# Patient Record
Sex: FEMALE | Race: BLACK OR AFRICAN AMERICAN | URBAN - METROPOLITAN AREA
[De-identification: names, ages, dates, MRNs, and addresses within clinical notes are randomized per-mention and may not be internally consistent; named-entity substitution may affect disease eponyms.]

---

## 2018-04-18 ENCOUNTER — EXTERNAL NURSING HOME DOCUMENTATION (OUTPATIENT)
Dept: INTERNAL MEDICINE CLINIC | Age: 73
End: 2018-04-18

## 2018-04-18 DIAGNOSIS — I48.20 CHRONIC ATRIAL FIBRILLATION (HCC): Primary | ICD-10-CM

## 2018-04-18 DIAGNOSIS — I10 ESSENTIAL HYPERTENSION: ICD-10-CM

## 2018-04-18 DIAGNOSIS — D64.9 CHRONIC ANEMIA: ICD-10-CM

## 2018-04-18 DIAGNOSIS — J18.9 PNEUMONIA DUE TO INFECTIOUS ORGANISM, UNSPECIFIED LATERALITY, UNSPECIFIED PART OF LUNG: ICD-10-CM

## 2018-04-18 DIAGNOSIS — T81.30XA ABDOMINAL WOUND DEHISCENCE, INITIAL ENCOUNTER: ICD-10-CM

## 2018-04-18 DIAGNOSIS — E11.8 TYPE 2 DIABETES MELLITUS WITH COMPLICATION, UNSPECIFIED WHETHER LONG TERM INSULIN USE: ICD-10-CM

## 2018-04-18 DIAGNOSIS — N18.30 STAGE 3 CHRONIC KIDNEY DISEASE (HCC): ICD-10-CM

## 2018-04-19 ENCOUNTER — EXTERNAL NURSING HOME DOCUMENTATION (OUTPATIENT)
Dept: INTERNAL MEDICINE CLINIC | Age: 73
End: 2018-04-19

## 2018-04-19 DIAGNOSIS — Z98.890 S/P EXPLORATORY LAPAROTOMY: ICD-10-CM

## 2018-04-19 DIAGNOSIS — E11.8 TYPE 2 DIABETES MELLITUS WITH COMPLICATION, UNSPECIFIED WHETHER LONG TERM INSULIN USE: ICD-10-CM

## 2018-04-19 DIAGNOSIS — I48.91 ATRIAL FIBRILLATION, UNSPECIFIED TYPE (HCC): Primary | ICD-10-CM

## 2018-04-19 DIAGNOSIS — T81.89XD DELAYED SURGICAL WOUND HEALING, SUBSEQUENT ENCOUNTER: ICD-10-CM

## 2018-04-19 DIAGNOSIS — I10 ESSENTIAL HYPERTENSION: ICD-10-CM

## 2018-04-19 PROBLEM — T81.89XA DELAYED SURGICAL WOUND HEALING: Status: ACTIVE | Noted: 2018-04-19

## 2018-04-19 NOTE — PROGRESS NOTES
THIS IS NOT A COMPLETE MEDICAL RECORD ON THIS PATIENT. THIS IS A PATIENT AT [facility]. PLEASE CONTACT THE FACILITY LISTED BELOW FOR MORE INFORMATION ON THIS PATIENT. THE COMPLETE RECORD RESIDES WITH THIS LONG TERM CARE FACILITY. HISTORY OF PRESENT ILLNESS  Scarlet Cruz is a 81 y.o. female. This patient is currently under the care of Dr. Marie Sanchez at [facility].  The patient was admitted to this facility following hospitalization related to severe sepsis and Afib with RVR. The patient is s/p exploratory laparotomy and reduction of incarcerated ventral hernia with partial omentectomy on 03/26/18 by Dr. Bhumi Sargent. She underwent abdominal re-exploration and partial fascial closure with Dr. Bhumi Sargent on 03/29/18 and abdominal re-exploration and washout with ventral hernia repair and closure on 04/02/18. Her past medical history includes diabetes and hypertension. The patient is seen today for follow-up. Nursing does not report any concerns or changes in condition at this time. HPI    Review of Systems   Constitutional: Negative for chills and fever. Respiratory: Negative for shortness of breath. Cardiovascular: Negative for chest pain. Gastrointestinal: Negative for abdominal pain, nausea and vomiting. Musculoskeletal: Negative. Neurological: Negative for headaches. Physical Exam   Constitutional: She appears well-developed. No distress. HENT:   Head: Normocephalic and atraumatic. Cardiovascular: Normal rate and regular rhythm. Pulmonary/Chest: Effort normal and breath sounds normal. No respiratory distress. She has no wheezes. She has no rales. Abdominal: Soft. Bowel sounds are normal. She exhibits no distension. There is no tenderness. Dry dressing to midline abdomen   Musculoskeletal: She exhibits edema. Trace bilateral lower extremity edema   Neurological: She is alert. Oriented to self, place  Answering questions appropriately   Skin: Skin is warm and dry. Psychiatric: She has a normal mood and affect. ASSESSMENT and PLAN  Encounter Diagnoses   Name Primary?  Atrial fibrillation, unspecified type ([facility] [address] Yes    Type 2 diabetes mellitus with complication, unspecified whether long term insulin use (HCC)     Essential hypertension     S/P exploratory laparotomy     Delayed surgical wound healing, subsequent encounter      Patient has appt scheduled with [facility] surgery, Dr. Jean Burns on 04/26/18 at 12 noon. SNF staff notified to schedule appointment. Nursing to continue local wound care, as ordered. Continue PT/OT as tolerated. CBC,CMP,A1c as ordered. INR 8.0 this morning per nursing. HOLD warfarin. Repeat INR tomorrow morning. Notify of any s/sx of bleeding. Reviewed medications and side effects in detail. Continue current medications at this time. Nursing to continue to monitor closely and notify MD/NP of any change in condition.

## 2018-04-19 NOTE — PROGRESS NOTES
History of Present Illness: Francisca Son is a 81 y.o. black lady who was admitted to [facility] after hospitalization at [address] [place] [address] with a combination of cardiogenic and septic shock. She presented with A-fib with RVR and also had a pneumonia on CXR. The patient was treated with antibiotics. Echocardiogram showed EF of 35%. She has a number of other chronic medical issues. She also has had an abdominal hernia repair with a chronic wound. I have reviewed the records from the hospital.  She tells me she is feeling better. She reports some dyspnea, but denies headaches, dizziness, chest pain or abdominal pain. She seems to be a poor historian. The staff do not report any significant problems. PMH: HTN, DM, A-fib, congestive heart failure, CKD, anemia, abdominal wound, obesity. PSH: Abdominal wall hernia, bilateral tubal ligation. Allergies:  Penicillin. SH: She denies tobacco or ETOH use. She lives with her . .   FH: HTN. .   Medications:  See NH list including Tylenol, Amiodarone, Vitamin D, Colace, Glucagon, Lantus, Lispro Insulin, Lisinopril, Metoprolol, Nystatin powder, Oxycodone prn, Pravachol, Senna, and Coumadin. Physical Examination:   GENERAL:  Vital signs stable. Afebrile per nurse's notes Obese lady lying in bed, NAD and most answering questions properly, but is a poor historian. HEENT:  Unremarkable. NECK:  Supple, no JVD, thyromegaly or bruits. HEART:  Regular with ectopy. LUNGS:  Diminished sounds. ABDOMEN:  Soft, nontender, obese. Midline wound is packed and covered. There is no obvious drainage, infection or redness. EXTREMITIES: Some bilateral pedal edema. DJD changes with decreased ROM   NEURO:  Generalized weakness, otherwise nonfocal.     Impression:  A-fib. Pneumonia. Recent shock felt to be a combination to be septic and cardiogenic.  HTN. DM.  CKD. Chronic anemia. Abdominal wound. Plan:  Continue current medications. Baseline labs.   Wound care.  PT, OT and ST. Pain control. Full code.     MedDATA/gwo

## 2018-05-04 ENCOUNTER — EXTERNAL NURSING HOME DOCUMENTATION (OUTPATIENT)
Dept: INTERNAL MEDICINE CLINIC | Age: 73
End: 2018-05-04

## 2018-05-04 DIAGNOSIS — J02.9 SORE THROAT: Primary | ICD-10-CM

## 2018-05-04 DIAGNOSIS — R09.81 NASAL CONGESTION: ICD-10-CM

## 2018-05-04 NOTE — PROGRESS NOTES
THIS IS NOT A COMPLETE MEDICAL RECORD ON THIS PATIENT. THIS IS A PATIENT AT [facility]. PLEASE CONTACT THE FACILITY LISTED BELOW FOR MORE INFORMATION ON THIS PATIENT. THE COMPLETE RECORD RESIDES WITH THIS LONG TERM CARE FACILITY. HISTORY OF PRESENT ILLNESS  Rinku Lira is a 81 y.o. female. This patient is currently under the care of Dr. Korin Eastman at [facility].  The patient was admitted to this facility following hospitalization related to severe sepsis and Afib with RVR. The patient is s/p exploratory laparotomy and reduction of incarcerated ventral hernia with partial omentectomy on 03/26/18 by Dr. Wilhelmina Schirmer. She underwent abdominal re-exploration and partial fascial closure with Dr. Wilhelmina Schirmer on 03/29/18 and abdominal re-exploration and washout with ventral hernia repair and closure on 04/02/18. Her past medical history includes diabetes and hypertension. Patient is seen today per nursing request.  Patient has had complaints of sore throat, per nursing. Patient describes sore throat x 1 week. She states pain is worse with swallowing. She has some mild nasal congestion without cough. Vitals stable, per nursing. HPI    Review of Systems   Constitutional: Negative for chills and fever. HENT: Positive for congestion and sore throat. Respiratory: Negative for shortness of breath. Cardiovascular: Negative for chest pain. Gastrointestinal: Negative for abdominal pain. Musculoskeletal: Negative. Neurological: Negative for headaches. Physical Exam   Constitutional: She appears well-developed. No distress. HENT:   Head: Normocephalic and atraumatic. Mild nasal congestion  OP without exudate, erythema   Cardiovascular: Normal rate and regular rhythm. Pulmonary/Chest: Effort normal and breath sounds normal. No respiratory distress. She has no wheezes. She has no rales. Musculoskeletal: She exhibits edema.    Trace bilateral lower extremity edema   Neurological: She is alert. Answering simple questions appropriately   Skin: Skin is warm and dry. Psychiatric: She has a normal mood and affect. ASSESSMENT and PLAN  Encounter Diagnoses   Name Primary?  Sore throat Yes    Nasal congestion      Will order Chloraseptic sore throat spray- 1 spray q2h PRN sore throat; leave in place x 15 seconds, then spit. Will order Claritin 10 mg po daily x 7 days. Reviewed medications and side effects     Nursing to continue to monitor closely and notify MD/NP of any change in condition.

## 2018-05-09 ENCOUNTER — EXTERNAL NURSING HOME DOCUMENTATION (OUTPATIENT)
Dept: INTERNAL MEDICINE CLINIC | Age: 73
End: 2018-05-09

## 2018-05-09 DIAGNOSIS — I48.20 CHRONIC ATRIAL FIBRILLATION (HCC): Primary | ICD-10-CM

## 2018-05-09 DIAGNOSIS — E11.8 TYPE 2 DIABETES MELLITUS WITH COMPLICATION, UNSPECIFIED WHETHER LONG TERM INSULIN USE: ICD-10-CM

## 2018-05-09 DIAGNOSIS — N18.30 STAGE 3 CHRONIC KIDNEY DISEASE (HCC): ICD-10-CM

## 2018-05-09 DIAGNOSIS — T81.89XD DELAYED SURGICAL WOUND HEALING, SUBSEQUENT ENCOUNTER: ICD-10-CM

## 2018-05-09 DIAGNOSIS — I10 ESSENTIAL HYPERTENSION: ICD-10-CM

## 2018-05-09 DIAGNOSIS — D64.9 CHRONIC ANEMIA: ICD-10-CM

## 2018-05-10 NOTE — PROGRESS NOTES
History of Present Illness: Freddie Rodriguez is a 81 y.o. black lady who was admitted to [facility] for follow up. She came here recently after hospitalization with A-fib,  pneumonia and shock. She has a number of chronic medical issues. Overall, has done well. She tells me she is feeling better. She is working with rehab. She also has an abdominal wound and has been on a wound VAC with some secretions. According to nurses, she is not compliant with it. She has an appointment with her surgeons. The abdominal wound started after having surgery for abdominal hernia. The staff do not report any other significant problems. Allergies:  Penicillin. Medications:  See NH list which is reviewed and signed. Studies: Recent studies reviewed and for the most part, look stable. Physical Examination:   GENERAL:  Vital signs stable.  Afebrile. Obese, NAD and most answering questions properly. HEENT:  Unremarkable. NECK:  Supple, no JVD. HEART:  Regular with some ectopy.      LUNGS:  Diminished sounds but clear. ABDOMEN:  Soft, nontender, obese. Midline wound is packed. EXTREMITIES: Chronic bilateral pedal edema.  DJD changes. NEURO:  Generalized weakness, otherwise nonfocal.     Impression:  Chronic A-fib.  HTN. DM.  CHF.  CKD. Chronic anemia. Abdominal wound.     Plan:  Continue current medications. Follow up with surgeon and other MD's as scheduled. Continue wound care. Continue PT, OT. She seems to be making progress.   Full code.     MedDATA/gwo

## 2018-05-23 ENCOUNTER — EXTERNAL NURSING HOME DOCUMENTATION (OUTPATIENT)
Dept: INTERNAL MEDICINE CLINIC | Age: 73
End: 2018-05-23

## 2018-05-23 DIAGNOSIS — E11.8 TYPE 2 DIABETES MELLITUS WITH COMPLICATION, UNSPECIFIED WHETHER LONG TERM INSULIN USE: ICD-10-CM

## 2018-05-23 DIAGNOSIS — I10 ESSENTIAL HYPERTENSION: ICD-10-CM

## 2018-05-23 DIAGNOSIS — N18.30 STAGE 3 CHRONIC KIDNEY DISEASE (HCC): ICD-10-CM

## 2018-05-23 DIAGNOSIS — I48.20 CHRONIC ATRIAL FIBRILLATION (HCC): Primary | ICD-10-CM

## 2018-05-23 NOTE — PROGRESS NOTES
History of Present Illness: Rey Adkins is a 81 y.o. black lady who was admitted to [facility] for follow up. She came here recently after hospitalization with A-fib,  pneumonia and shock. She has a number of chronic medical issues. She also has a chronic nonhealing abdominal wound from a recent ventral hernia repair. She reports no significant problems. She is doing well with rehab. She tells me she is in the process of being discharged home. The staff do not report any other significant problems.  She has been followed by surgeons at [facility] as well.     Allergies:  Penicillin. Medications:  See NH list which is reviewed and signed.     Studies: Recent studies reviewed and for the most part, look stable. Physical Examination:   GENERAL:  Vital signs stable.  Afebrile. Obese lady in chair, NAD and most answering questions properly. HEENT:  Unremarkable. NECK:  Supple, no JVD. HEART:  Regular with distant sounds.       LUNGS:  Diminished sounds but clear.   ABDOMEN:  Soft, nontender, obese. Midline wound looks clean and healing nicely.    EXTREMITIES: Chronic bilateral pedal edema with DJD changes. NEURO:  Generalized weakness, otherwise nonfocal.     Impression:  Chronic A-fib.  HTN. DM.  CHF.  CKD. Abdominal wound, healing. Chronic anemia.      Plan:  Continue current medications. Continue PT, OT. Discharge planning. Follow up with [facility] surgeons as scheduled.    Full code.      MedDATA/gwo

## 2018-05-31 PROBLEM — N18.30 STAGE 3 CHRONIC KIDNEY DISEASE (HCC): Status: ACTIVE | Noted: 2018-05-31

## 2018-05-31 PROBLEM — D64.9 CHRONIC ANEMIA: Status: ACTIVE | Noted: 2018-05-31

## 2018-07-11 ENCOUNTER — EXTERNAL NURSING HOME DOCUMENTATION (OUTPATIENT)
Dept: INTERNAL MEDICINE CLINIC | Age: 73
End: 2018-07-11

## 2018-07-11 DIAGNOSIS — T81.89XD DELAYED SURGICAL WOUND HEALING, SUBSEQUENT ENCOUNTER: ICD-10-CM

## 2018-07-11 DIAGNOSIS — I48.20 CHRONIC ATRIAL FIBRILLATION (HCC): Primary | ICD-10-CM

## 2018-07-11 DIAGNOSIS — N18.30 STAGE 3 CHRONIC KIDNEY DISEASE (HCC): ICD-10-CM

## 2018-07-11 DIAGNOSIS — I10 ESSENTIAL HYPERTENSION: ICD-10-CM

## 2018-07-11 DIAGNOSIS — E11.8 TYPE 2 DIABETES MELLITUS WITH COMPLICATION, UNSPECIFIED WHETHER LONG TERM INSULIN USE: ICD-10-CM

## 2018-07-24 DIAGNOSIS — T81.89XD DELAYED SURGICAL WOUND HEALING, SUBSEQUENT ENCOUNTER: Primary | ICD-10-CM

## 2018-07-24 RX ORDER — DOCUSATE SODIUM 100 MG/1
100 CAPSULE, LIQUID FILLED ORAL 2 TIMES DAILY
Qty: 60 CAP | Refills: 0 | Status: SHIPPED | OUTPATIENT
Start: 2018-07-24 | End: 2018-08-23

## 2018-07-24 RX ORDER — NYSTATIN 100000 [USP'U]/G
POWDER TOPICAL 4 TIMES DAILY
Qty: 30 G | Refills: 0 | Status: SHIPPED | OUTPATIENT
Start: 2018-07-24

## 2018-07-24 RX ORDER — METOPROLOL TARTRATE 50 MG/1
50 TABLET ORAL 3 TIMES DAILY
Qty: 90 TAB | Refills: 0 | Status: SHIPPED | OUTPATIENT
Start: 2018-07-24

## 2018-07-24 RX ORDER — INSULIN GLARGINE 100 [IU]/ML
INJECTION, SOLUTION SUBCUTANEOUS
Qty: 1 VIAL | Refills: 0 | Status: SHIPPED | OUTPATIENT
Start: 2018-07-24

## 2018-07-24 RX ORDER — AMOXICILLIN 250 MG
2 CAPSULE ORAL DAILY
Qty: 60 TAB | Refills: 0 | Status: SHIPPED | OUTPATIENT
Start: 2018-07-24

## 2018-07-24 RX ORDER — OMEPRAZOLE 40 MG/1
40 CAPSULE, DELAYED RELEASE ORAL DAILY
Qty: 30 CAP | Refills: 0 | Status: SHIPPED | OUTPATIENT
Start: 2018-07-24

## 2018-07-24 RX ORDER — OXYCODONE HYDROCHLORIDE 5 MG/1
5 CAPSULE ORAL
Qty: 30 CAP | Refills: 0 | Status: SHIPPED | OUTPATIENT
Start: 2018-07-24

## 2018-07-24 RX ORDER — LISINOPRIL 40 MG/1
40 TABLET ORAL DAILY
Qty: 30 TAB | Refills: 0 | Status: SHIPPED | OUTPATIENT
Start: 2018-07-24

## 2018-07-24 RX ORDER — WARFARIN 4 MG/1
4 TABLET ORAL DAILY
Qty: 30 TAB | Refills: 0 | Status: SHIPPED | OUTPATIENT
Start: 2018-07-24

## 2018-07-24 RX ORDER — PRAVASTATIN SODIUM 40 MG/1
40 TABLET ORAL
Qty: 30 TAB | Refills: 0 | Status: SHIPPED | OUTPATIENT
Start: 2018-07-24

## 2018-07-24 RX ORDER — MELATONIN
1000 DAILY
Qty: 30 TAB | Refills: 0 | Status: SHIPPED | OUTPATIENT
Start: 2018-07-24

## 2018-07-24 RX ORDER — INSULIN LISPRO 100 [IU]/ML
INJECTION, SOLUTION INTRAVENOUS; SUBCUTANEOUS
Qty: 1 VIAL | Refills: 0
Start: 2018-07-24

## 2018-07-24 RX ORDER — AMIODARONE HYDROCHLORIDE 200 MG/1
200 TABLET ORAL DAILY
Qty: 30 TAB | Refills: 0 | Status: SHIPPED | OUTPATIENT
Start: 2018-07-24

## 2018-07-25 ENCOUNTER — EXTERNAL NURSING HOME DOCUMENTATION (OUTPATIENT)
Dept: INTERNAL MEDICINE CLINIC | Age: 73
End: 2018-07-25

## 2018-07-25 DIAGNOSIS — I48.20 CHRONIC ATRIAL FIBRILLATION (HCC): Primary | ICD-10-CM

## 2018-07-25 DIAGNOSIS — T81.89XD DELAYED SURGICAL WOUND HEALING, SUBSEQUENT ENCOUNTER: ICD-10-CM

## 2018-07-25 DIAGNOSIS — I10 ESSENTIAL HYPERTENSION: ICD-10-CM

## 2018-07-25 DIAGNOSIS — E11.8 TYPE 2 DIABETES MELLITUS WITH COMPLICATION, UNSPECIFIED WHETHER LONG TERM INSULIN USE: ICD-10-CM

## 2018-07-25 DIAGNOSIS — N18.30 STAGE 3 CHRONIC KIDNEY DISEASE (HCC): ICD-10-CM

## 2018-07-25 NOTE — PROGRESS NOTES
History of Present Illness: Angella Plasencia is a 81 y.o. black lady seen at [facility] for follow up. She has been here for a few weeks. She came here recently after hospitalization. She has a number of chronic medical issues. She has done really good with rehab and she is ready for discharge. She is denying any significant problems today including headaches, dizziness, chest pain, dyspnea,GI or  problems. Her abdominal wound has healed nicely. She is still followed by surgery. The staff do not report any significant new problems. Allergies: Penicillin. Medications:  See NH list which is reviewed and signed.     Studies: Recent studies reviewed and all have been stable. Physical Examination:   GENERAL:  Vital signs stable.  Afebrile. Obese, pleasant lady in chair,  NAD and most answering questions properly. HEENT:  Unremarkable. NECK:  Supple. HEART:  Regular.      LUNGS:  Diminished sounds but clear.   ABDOMEN:  Soft, nontender, obese. Midline wound has healed nicely. No evidence of infection.   EXTREMITIES: Chronic bilateral pedal edema.  DJD changes. NEURO:  Generalized weakness, otherwise nonfocal.     Impression:  Chronic A-fib.  HTN. DM.  CHF.  CKD. Chronic abdominal wound which has healed nicely.      Plan:  Continue current medications. She is ready for discharge on current medications. Follow up with PCP and surgeon within 2 weeks of discharge. Full code.